# Patient Record
Sex: FEMALE | Race: WHITE | NOT HISPANIC OR LATINO | URBAN - METROPOLITAN AREA
[De-identification: names, ages, dates, MRNs, and addresses within clinical notes are randomized per-mention and may not be internally consistent; named-entity substitution may affect disease eponyms.]

---

## 2019-08-16 NOTE — H&P ADULT - NSICDXPASTMEDICALHX_GEN_ALL_CORE_FT
PAST MEDICAL HISTORY:  Aortic insufficiency     Breast cancer left    Carotid atherosclerosis     Hyperlipemia     Hypertension PAST MEDICAL HISTORY:  Aortic insufficiency     Breast cancer left    CAD in native artery     Carotid atherosclerosis     Hyperlipemia     Hypertension

## 2019-08-16 NOTE — H&P ADULT - PROBLEM SELECTOR PROBLEM 1
Causing some diarrhea.  If persistent will consider changing to prolia.    CAD (coronary artery disease)

## 2019-08-16 NOTE — H&P ADULT - PROBLEM SELECTOR PLAN 1
Pt is referred for Lt heart cath/possible PCI. Labs & medications are reviewed. Informed consent obtained after discussion of [facility] risks, benefits and alternatives  with patient. Risk discussed included, but not limited to MI, stroke, mortality, major bleeding, arrhythmia, or infection.  An educational material provided. Pt. verbalizes understandings of pre-procedural instructions.

## 2019-08-16 NOTE — H&P ADULT - NSICDXPASTSURGICALHX_GEN_ALL_CORE_FT
PAST SURGICAL HISTORY:  History of cataract extraction bilateral    History of tonsillectomy childhood    S/P D&C     S/P mastectomy left

## 2019-08-16 NOTE — H&P ADULT - NSHPREVIEWOFSYSTEMS_GEN_ALL_CORE
CONSTITUTIONAL: No fever, weight loss, or fatigue  EYES: No eye pain, visual disturbances, or discharge  ENMT:  No difficulty hearing, tinnitus, vertigo; No sinus or throat pain  NECK: No pain or stiffness  BREASTS: No pain, masses, or nipple discharge  RESPIRATORY: No cough, wheezing, chills or hemoptysis; No shortness of breath  CARDIOVASCULAR: No chest pain, palpitations, dizziness, or leg swelling  GASTROINTESTINAL: No abdominal or epigastric pain. No nausea, vomiting, or hematemesis; No diarrhea or constipation. No melena or hematochezia.  GENITOURINARY: No dysuria, frequency, hematuria, or incontinence  NEUROLOGICAL: No headaches, memory loss, loss of strength, numbness, or tremors  SKIN: No itching, burning, rashes, or lesions   LYMPH NODES: No enlarged glands  ENDOCRINE: No heat or cold intolerance; No hair loss  MUSCULOSKELETAL: No joint pain or swelling; No muscle, back, or extremity pain  PSYCHIATRIC: No depression, anxiety, mood swings, or difficulty sleeping  HEME/LYMPH: No easy bruising, or bleeding gums  ALLERGY AND IMMUNOLOGIC: No hives or eczema General: Pt denies recent weight loss/fever/chills    Neurological: denies numbness or  sensation loss    Cardiovascular: denies palpitations/leg edema (+)chest pressure on exertion    Respiratory and Thorax: denies SOB/cough/wheezing    Gastrointestinal: denies abdominal pain/diarrhea/constipation/bloody stool    Genitourinary: denies urinary frequency/urgency/ dysuria    Musculoskeletal: denies joint pain or swelling, denies restricted motion    Hematologic: denies abnormal bleeding

## 2019-08-16 NOTE — H&P ADULT - ASSESSMENT
84yr old female PMH of HTN, HLD, CAD ( s/p PCI of RCA in 2015) AI, carotid atherosclerosis, has been c/o intermittent Lt. chest pressure in exertion. Pt underwent stress test which showed (+) AS/AA ischemia.  Pt referred for LHC with possible intervention.  ASA: 2  Calculated bleeding risk score: 2.3 %  Creatinine: 1.01  GFR: 51

## 2019-08-16 NOTE — H&P ADULT - HISTORY OF PRESENT ILLNESS
84yr old male PMH HTN, HLD, CAD, AI, carotid atherosclerosis 84yr old female PMH of HTN, HLD, CAD ( s/p PCI of RCA in 2015) AI, carotid atherosclerosis, has been c/o intermittent Lt. chest pressure in exertion. Pt underwent stress test which showed (+) AS/AA ischemia.  Pt referred for LHC with possible intervention.

## 2019-08-16 NOTE — H&P ADULT - NSHPPHYSICALEXAM_GEN_ALL_CORE
GENERAL: NAD, well-groomed, well-developed  HEAD:  Atraumatic, Normocephalic  EYES: EOMI, PERRLA, conjunctiva and sclera clear  ENMT: No tonsillar erythema, exudates, or enlargement; Moist mucous membranes, Good dentition, No lesions  NECK: Supple, No JVD, Normal thyroid  NERVOUS SYSTEM:  Alert & Oriented X3, Good concentration; Motor Strength 5/5 B/L upper and lower extremities; DTRs 2+ intact and symmetric  CHEST/LUNG: Clear to auscultation bilaterally; No rales, rhonchi, wheezing, or rubs  HEART: Regular rate and rhythm; No murmurs, rubs, or gallops  ABDOMEN: Soft, Nontender, Nondistended; Bowel sounds present  EXTREMITIES:  2+ Peripheral Pulses, No clubbing, cyanosis, or edema  LYMPH: No lymphadenopathy noted  SKIN: No rashes or lesions Vital Signs : /70       HR  67     RR  16               Constitutional: well developed, well nourished, no deformities and no acute distress    Neurological: Alert & Oriented x 3, RIVAS, no focal deficits    HEENT: NC/AT, PERRLA, EOMI,  Neck supple.    Respiratory: CTA B/L, No wheezing/crackles/rhonchi    Cardiovascular: (+) S1 & S2, RRR, No m/r/g    Gastrointestinal: soft, NT, nondistended, (+) BS    Genitourinary: non distended bladder, voiding freely    Extremities: No pedal edema, No clubbing, No cyanosis    Skin:  normal skin color and pigmentation, no skin lesions

## 2019-08-19 ENCOUNTER — OUTPATIENT (OUTPATIENT)
Dept: OUTPATIENT SERVICES | Facility: HOSPITAL | Age: 84
LOS: 1 days | Discharge: HOME | End: 2019-08-19
Payer: MEDICARE

## 2019-08-19 VITALS
HEART RATE: 67 BPM | SYSTOLIC BLOOD PRESSURE: 149 MMHG | RESPIRATION RATE: 16 BRPM | OXYGEN SATURATION: 100 % | WEIGHT: 149.91 LBS | DIASTOLIC BLOOD PRESSURE: 72 MMHG | HEIGHT: 61 IN

## 2019-08-19 VITALS
RESPIRATION RATE: 18 BRPM | SYSTOLIC BLOOD PRESSURE: 137 MMHG | OXYGEN SATURATION: 99 % | HEART RATE: 66 BPM | DIASTOLIC BLOOD PRESSURE: 59 MMHG

## 2019-08-19 DIAGNOSIS — E78.00 PURE HYPERCHOLESTEROLEMIA, UNSPECIFIED: ICD-10-CM

## 2019-08-19 DIAGNOSIS — I25.110 ATHEROSCLEROTIC HEART DISEASE OF NATIVE CORONARY ARTERY WITH UNSTABLE ANGINA PECTORIS: ICD-10-CM

## 2019-08-19 DIAGNOSIS — I25.10 ATHEROSCLEROTIC HEART DISEASE OF NATIVE CORONARY ARTERY WITHOUT ANGINA PECTORIS: ICD-10-CM

## 2019-08-19 DIAGNOSIS — E78.5 HYPERLIPIDEMIA, UNSPECIFIED: ICD-10-CM

## 2019-08-19 DIAGNOSIS — R94.39 ABNORMAL RESULT OF OTHER CARDIOVASCULAR FUNCTION STUDY: ICD-10-CM

## 2019-08-19 DIAGNOSIS — I20.0 UNSTABLE ANGINA: ICD-10-CM

## 2019-08-19 DIAGNOSIS — Z85.3 PERSONAL HISTORY OF MALIGNANT NEOPLASM OF BREAST: ICD-10-CM

## 2019-08-19 DIAGNOSIS — I10 ESSENTIAL (PRIMARY) HYPERTENSION: ICD-10-CM

## 2019-08-19 DIAGNOSIS — Z88.1 ALLERGY STATUS TO OTHER ANTIBIOTIC AGENTS STATUS: ICD-10-CM

## 2019-08-19 PROCEDURE — 93454 CORONARY ARTERY ANGIO S&I: CPT

## 2019-08-19 PROCEDURE — C1874: CPT

## 2019-08-19 PROCEDURE — C1894: CPT

## 2019-08-19 PROCEDURE — C1760: CPT

## 2019-08-19 PROCEDURE — C1769: CPT

## 2019-08-19 PROCEDURE — C1887: CPT

## 2019-08-19 NOTE — PROGRESS NOTE ADULT - SUBJECTIVE AND OBJECTIVE BOX
Cath Lab Nurse Practitioner Note  HPI:  84yr old female PMH of HTN, HLD, CAD ( s/p PCI of RCA in 2015) AI, carotid atherosclerosis, has been c/o intermittent Lt. chest pressure in exertion. Pt underwent stress test which showed (+) AS/AA ischemia.  Pt referred for LHC with possible intervention.     s/p LHC :  of LAD  Pt denies chest pain/SOB/palpitations post cath.    Physical exam:  Vital Signs: /74  HR 63 RR 16  Cardiac : (+)S1S2, RRR  Lungs: CTA B/L  Abd: soft, NT, (+)BS  Ext: Rt groin (+) perclose closure device , 2+ Rt DP    A/P : CAD,  of LAD  -encourage po hydration  -medical Mx for CAD, ASA/plavix/b-blocker/statin  -d/c home today  -PCI with rotablator of LAD at [facility] as out pt  -f/u with  in 1-2 weeks  -Plan discussed with pt/Dr. Ramirez.

## 2019-08-19 NOTE — PACU DISCHARGE NOTE - COMMENTS
Pt. and dtr. verb. agreement and understanding to and teach back to written and verbal discharge instructions and medication list.   Pt. discharged to home via private auto accompanied by dtr; escorted to auto via w/c by transport tech.

## 2019-08-26 ENCOUNTER — INPATIENT (INPATIENT)
Facility: HOSPITAL | Age: 84
LOS: 0 days | Discharge: HOME | DRG: 287 | End: 2019-08-26
Attending: INTERNAL MEDICINE | Admitting: INTERNAL MEDICINE
Payer: COMMERCIAL

## 2019-08-26 ENCOUNTER — TRANSCRIPTION ENCOUNTER (OUTPATIENT)
Age: 84
End: 2019-08-26

## 2019-08-26 VITALS
DIASTOLIC BLOOD PRESSURE: 64 MMHG | OXYGEN SATURATION: 95 % | RESPIRATION RATE: 17 BRPM | TEMPERATURE: 98 F | SYSTOLIC BLOOD PRESSURE: 128 MMHG | HEART RATE: 66 BPM

## 2019-08-26 VITALS
TEMPERATURE: 92 F | HEART RATE: 68 BPM | OXYGEN SATURATION: 99 % | SYSTOLIC BLOOD PRESSURE: 186 MMHG | RESPIRATION RATE: 16 BRPM | HEIGHT: 61 IN | WEIGHT: 149.91 LBS | DIASTOLIC BLOOD PRESSURE: 87 MMHG

## 2019-08-26 DIAGNOSIS — I25.10 ATHEROSCLEROTIC HEART DISEASE OF NATIVE CORONARY ARTERY WITHOUT ANGINA PECTORIS: ICD-10-CM

## 2019-08-26 PROBLEM — I35.1 NONRHEUMATIC AORTIC (VALVE) INSUFFICIENCY: Chronic | Status: ACTIVE | Noted: 2019-08-16

## 2019-08-26 PROBLEM — I65.29 OCCLUSION AND STENOSIS OF UNSPECIFIED CAROTID ARTERY: Chronic | Status: ACTIVE | Noted: 2019-08-16

## 2019-08-26 LAB
ALBUMIN SERPL ELPH-MCNC: 4.6 G/DL — SIGNIFICANT CHANGE UP (ref 3.3–5)
ALP SERPL-CCNC: 69 U/L — SIGNIFICANT CHANGE UP (ref 40–120)
ALT FLD-CCNC: 19 U/L — SIGNIFICANT CHANGE UP (ref 10–45)
ANION GAP SERPL CALC-SCNC: 14 MMOL/L — SIGNIFICANT CHANGE UP (ref 5–17)
AST SERPL-CCNC: 34 U/L — SIGNIFICANT CHANGE UP (ref 10–40)
BILIRUB SERPL-MCNC: 1.3 MG/DL — HIGH (ref 0.2–1.2)
BUN SERPL-MCNC: 22 MG/DL — SIGNIFICANT CHANGE UP (ref 7–23)
CALCIUM SERPL-MCNC: 10.3 MG/DL — SIGNIFICANT CHANGE UP (ref 8.4–10.5)
CHLORIDE SERPL-SCNC: 103 MMOL/L — SIGNIFICANT CHANGE UP (ref 96–108)
CO2 SERPL-SCNC: 22 MMOL/L — SIGNIFICANT CHANGE UP (ref 22–31)
CREAT SERPL-MCNC: 1.1 MG/DL — SIGNIFICANT CHANGE UP (ref 0.5–1.3)
GLUCOSE SERPL-MCNC: 101 MG/DL — HIGH (ref 70–99)
HCT VFR BLD CALC: 40.7 % — SIGNIFICANT CHANGE UP (ref 34.5–45)
HGB BLD-MCNC: 13.5 G/DL — SIGNIFICANT CHANGE UP (ref 11.5–15.5)
MCHC RBC-ENTMCNC: 31.5 PG — SIGNIFICANT CHANGE UP (ref 27–34)
MCHC RBC-ENTMCNC: 33.2 GM/DL — SIGNIFICANT CHANGE UP (ref 32–36)
MCV RBC AUTO: 95 FL — SIGNIFICANT CHANGE UP (ref 80–100)
PLATELET # BLD AUTO: 240 K/UL — SIGNIFICANT CHANGE UP (ref 150–400)
POTASSIUM SERPL-MCNC: 5.1 MMOL/L — SIGNIFICANT CHANGE UP (ref 3.5–5.3)
POTASSIUM SERPL-SCNC: 5.1 MMOL/L — SIGNIFICANT CHANGE UP (ref 3.5–5.3)
PROT SERPL-MCNC: 7.9 G/DL — SIGNIFICANT CHANGE UP (ref 6–8.3)
RBC # BLD: 4.29 M/UL — SIGNIFICANT CHANGE UP (ref 3.8–5.2)
RBC # FLD: 12 % — SIGNIFICANT CHANGE UP (ref 10.3–14.5)
SODIUM SERPL-SCNC: 139 MMOL/L — SIGNIFICANT CHANGE UP (ref 135–145)
WBC # BLD: 7.2 K/UL — SIGNIFICANT CHANGE UP (ref 3.8–10.5)
WBC # FLD AUTO: 7.2 K/UL — SIGNIFICANT CHANGE UP (ref 3.8–10.5)

## 2019-08-26 PROCEDURE — 85027 COMPLETE CBC AUTOMATED: CPT

## 2019-08-26 PROCEDURE — 93010 ELECTROCARDIOGRAM REPORT: CPT

## 2019-08-26 PROCEDURE — 99153 MOD SED SAME PHYS/QHP EA: CPT

## 2019-08-26 PROCEDURE — C1725: CPT

## 2019-08-26 PROCEDURE — 80053 COMPREHEN METABOLIC PANEL: CPT

## 2019-08-26 PROCEDURE — C1769: CPT

## 2019-08-26 PROCEDURE — C1887: CPT

## 2019-08-26 PROCEDURE — 99152 MOD SED SAME PHYS/QHP 5/>YRS: CPT

## 2019-08-26 PROCEDURE — 92943 PRQ TRLUML REVSC CH OCC ANT: CPT | Mod: 74,LD

## 2019-08-26 PROCEDURE — C1894: CPT

## 2019-08-26 PROCEDURE — 93005 ELECTROCARDIOGRAM TRACING: CPT

## 2019-08-26 PROCEDURE — 93458 L HRT ARTERY/VENTRICLE ANGIO: CPT

## 2019-08-26 RX ORDER — AMLODIPINE BESYLATE 2.5 MG/1
2.5 TABLET ORAL
Refills: 0 | Status: DISCONTINUED | OUTPATIENT
Start: 2019-08-26 | End: 2019-08-26

## 2019-08-26 RX ORDER — METOPROLOL TARTRATE 50 MG
50 TABLET ORAL DAILY
Refills: 0 | Status: DISCONTINUED | OUTPATIENT
Start: 2019-08-27 | End: 2019-08-26

## 2019-08-26 RX ORDER — LOSARTAN POTASSIUM 100 MG/1
100 TABLET, FILM COATED ORAL DAILY
Refills: 0 | Status: DISCONTINUED | OUTPATIENT
Start: 2019-08-26 | End: 2019-08-26

## 2019-08-26 RX ORDER — CLOPIDOGREL BISULFATE 75 MG/1
75 TABLET, FILM COATED ORAL DAILY
Refills: 0 | Status: DISCONTINUED | OUTPATIENT
Start: 2019-08-27 | End: 2019-08-26

## 2019-08-26 RX ORDER — ASPIRIN/CALCIUM CARB/MAGNESIUM 324 MG
81 TABLET ORAL DAILY
Refills: 0 | Status: DISCONTINUED | OUTPATIENT
Start: 2019-08-26 | End: 2019-08-26

## 2019-08-26 RX ORDER — CALCIUM CARBONATE 500(1250)
0 TABLET ORAL
Qty: 0 | Refills: 0 | DISCHARGE

## 2019-08-26 RX ORDER — ATORVASTATIN CALCIUM 80 MG/1
40 TABLET, FILM COATED ORAL AT BEDTIME
Refills: 0 | Status: DISCONTINUED | OUTPATIENT
Start: 2019-08-26 | End: 2019-08-26

## 2019-08-26 RX ADMIN — Medication 1 TABLET(S): at 18:14

## 2019-08-26 RX ADMIN — Medication 81 MILLIGRAM(S): at 18:14

## 2019-08-26 RX ADMIN — AMLODIPINE BESYLATE 2.5 MILLIGRAM(S): 2.5 TABLET ORAL at 18:14

## 2019-08-26 NOTE — DISCHARGE NOTE PROVIDER - NSDCFUADDINST_GEN_ALL_CORE_FT
No heavy lifting, strenuous activity, bending, straining, or unnecessary stair climbing for 2 weeks. No driving for 2 days. You may shower 24 hours following the procedure but avoid baths/swimming for 1 week. Check your groin site for bleeding and/or swelling daily following procedure and call your doctor immediately if it occurs or if you experience increased pain at the site. Follow up with your cardiologist in 1-2 weeks. You may call [facility] if you have any questions/concerns regarding your procedure [phone].

## 2019-08-26 NOTE — CHART NOTE - NSCHARTNOTEFT_GEN_A_CORE
Removal of Femoral Sheath    Pulses in the right lower extremity are palpable. The patient was placed in the supine position. The insertion site was identified and the sutures were removed per protocol.  The 6____ [place] femoral sheath was then removed. Direct pressure was applied for  __20____ minutes.     Monitoring of the right groin and both lower extremities including neuro-vascular checks and vital signs every 15 minutes x 4, then every 30 minutes x 2, then every 1 hour was ordered.    Complications: None    Comments: patient may sit up at 1630 and ambulate at 1830 if right groin is stable    ALMA Marcus [id number]

## 2019-08-26 NOTE — H&P CARDIOLOGY - PSH
History of cataract extraction  bilateral  History of tonsillectomy  childhood  S/P D&C    S/P mastectomy  left

## 2019-08-26 NOTE — DISCHARGE NOTE PROVIDER - NSDCCPTREATMENT_GEN_ALL_CORE_FT
PRINCIPAL PROCEDURE  Procedure: Cardiac catheterization, left heart  Findings and Treatment: Cardiac cath was performed for staged PCI of LAD however the lesion was unable to be crossed.

## 2019-08-26 NOTE — H&P CARDIOLOGY - PMH
Aortic insufficiency    Breast cancer  left  CAD in native artery    Carotid atherosclerosis    H/O thyroid nodule    Hyperlipemia    Hypertension    Non-rheumatic tricuspid valve insufficiency

## 2019-08-26 NOTE — DISCHARGE NOTE PROVIDER - NSDCCPCAREPLAN_GEN_ALL_CORE_FT
PRINCIPAL DISCHARGE DIAGNOSIS  Diagnosis: CAD (coronary artery disease)  Assessment and Plan of Treatment: Diagnostic cath performed at [facility] revealed distal LAD disease. The patient was brought to [facility] for staged PCI of LAD, however the lesion could not be crossed and the PCI was unsuccessful. She will follow up with Dr. Ramirez and continue her current medication.

## 2019-08-26 NOTE — DISCHARGE NOTE PROVIDER - CARE PROVIDER_API CALL
Cholo Hill)  Cardiovascular Disease; Internal Medicine  [address] [place] [address]  Phone: [phone]  Fax: [phone]  Follow Up Time:     Dee Ramirez)  Cardiovascular Disease; Interventional Cardiology  [address] [place] [address]  Phone: [phone]  Fax: [phone]  Follow Up Time:

## 2019-08-26 NOTE — DISCHARGE NOTE NURSING/CASE MANAGEMENT/SOCIAL WORK - NSDCDPATPORTLINK_GEN_ALL_CORE
You can access the [facility], offered by [facility], by registering with the following website: [email or web address]

## 2019-08-26 NOTE — DISCHARGE NOTE PROVIDER - HOSPITAL COURSE
84yr old  female with PMH of HTN, HLD, carotid stenosis (<501%), CAD (s/p PCI of RCA in 2015), mild aortic stenosis with moderate aortic regurgitation, mild mitral regurgitation, moderate tricuspid regurgitation carotid atherosclerosis, has been c/o intermittent left chest pressure on exertion for few weeks. Pt was evaluated by her cardiologist and underwent nuclear stress test on 8/05/19 which showed (+) anteroseptal, anteroapical ischemia.  Pt was referred for LHC with Dr Price and s/p diagnostic LHC on 8/19/19 at [facility] (result not on [place]) and presents today for planned PCI here at [facility] with Dr Price.  Cardiac cath was performed by Dr. Ramirez, however the lesion was unable to be crossed and the PCI was unsuccessful.  She will be discharged today as per Dr. Ramirez and follow up in 2 months for possible intervention.

## 2019-08-26 NOTE — H&P CARDIOLOGY - HISTORY OF PRESENT ILLNESS
84yr old  female with PMH of HTN, HLD, CAD (s/p PCI of RCA in 2015), mild aortic stenosis with moderate aortic regurgitation, mild mitral regurgitation, moderate tricuspid regurgitation carotid atherosclerosis, has been c/o intermittent left chest pressure on exertion for few weeks. Pt was evaluated by her cardiologist and underwent nuclear stress test on 8/05/19 which showed (+) anteroseptal, anteroapical ischemia.  Pt was referred for LHC with Dr Price and s/p diagnostic LHC on 8/19/19 at [facility] (result not on [place]) and presents today for planned PCI here at [facility] with Dr Price.  Denied dizziness, diaphoresis, palpitations, nausea, vomiting, peripheral edema, recent weight gain, or syncope.     PCP Dr Danielle Coulter  Cards Dr Cholo Hill 84yr old  female with PMH of HTN, HLD, carotid stenosis (<501%), CAD (s/p PCI of RCA in 2015), mild aortic stenosis with moderate aortic regurgitation, mild mitral regurgitation, moderate tricuspid regurgitation carotid atherosclerosis, has been c/o intermittent left chest pressure on exertion for few weeks. Pt was evaluated by her cardiologist and underwent nuclear stress test on 8/05/19 which showed (+) anteroseptal, anteroapical ischemia.  Pt was referred for LHC with Dr Price and s/p diagnostic LHC on 8/19/19 at [facility] (result not on [place]) and presents today for planned PCI here at [facility] with Dr Price.  Denied dizziness, diaphoresis, palpitations, nausea, vomiting, peripheral edema, recent weight gain, or syncope.     PCP Dr Danielle Coulter  Cards Dr Cholo Hill

## 2019-08-26 NOTE — H&P CARDIOLOGY - ADDITIONAL PE
Rt groin ecchymosis due to prior cath last week. Rt wrist and groin mild ecchymosis, due to prior cath last week.

## 2019-11-20 PROBLEM — I36.1 NONRHEUMATIC TRICUSPID (VALVE) INSUFFICIENCY: Chronic | Status: ACTIVE | Noted: 2019-08-26

## 2019-11-20 PROBLEM — Z86.39 PERSONAL HISTORY OF OTHER ENDOCRINE, NUTRITIONAL AND METABOLIC DISEASE: Chronic | Status: ACTIVE | Noted: 2019-08-26

## 2019-11-25 ENCOUNTER — OUTPATIENT (OUTPATIENT)
Dept: OUTPATIENT SERVICES | Facility: HOSPITAL | Age: 84
LOS: 1 days | End: 2019-11-25
Payer: MEDICARE

## 2019-11-25 VITALS
TEMPERATURE: 98 F | DIASTOLIC BLOOD PRESSURE: 84 MMHG | OXYGEN SATURATION: 98 % | RESPIRATION RATE: 18 BRPM | WEIGHT: 151.9 LBS | SYSTOLIC BLOOD PRESSURE: 183 MMHG | HEIGHT: 61 IN | HEART RATE: 67 BPM

## 2019-11-25 DIAGNOSIS — I25.10 ATHEROSCLEROTIC HEART DISEASE OF NATIVE CORONARY ARTERY WITHOUT ANGINA PECTORIS: ICD-10-CM

## 2019-11-25 LAB
ALBUMIN SERPL ELPH-MCNC: 4.6 G/DL — SIGNIFICANT CHANGE UP (ref 3.3–5)
ALP SERPL-CCNC: 62 U/L — SIGNIFICANT CHANGE UP (ref 40–120)
ALT FLD-CCNC: 15 U/L — SIGNIFICANT CHANGE UP (ref 10–45)
ANION GAP SERPL CALC-SCNC: 10 MMOL/L — SIGNIFICANT CHANGE UP (ref 5–17)
AST SERPL-CCNC: 22 U/L — SIGNIFICANT CHANGE UP (ref 10–40)
BILIRUB SERPL-MCNC: 0.9 MG/DL — SIGNIFICANT CHANGE UP (ref 0.2–1.2)
BUN SERPL-MCNC: 17 MG/DL — SIGNIFICANT CHANGE UP (ref 7–23)
CALCIUM SERPL-MCNC: 10.1 MG/DL — SIGNIFICANT CHANGE UP (ref 8.4–10.5)
CHLORIDE SERPL-SCNC: 103 MMOL/L — SIGNIFICANT CHANGE UP (ref 96–108)
CO2 SERPL-SCNC: 25 MMOL/L — SIGNIFICANT CHANGE UP (ref 22–31)
CREAT SERPL-MCNC: 1.06 MG/DL — SIGNIFICANT CHANGE UP (ref 0.5–1.3)
GLUCOSE SERPL-MCNC: 109 MG/DL — HIGH (ref 70–99)
HCT VFR BLD CALC: 39.1 % — SIGNIFICANT CHANGE UP (ref 34.5–45)
HGB BLD-MCNC: 13.2 G/DL — SIGNIFICANT CHANGE UP (ref 11.5–15.5)
MCHC RBC-ENTMCNC: 31.6 PG — SIGNIFICANT CHANGE UP (ref 27–34)
MCHC RBC-ENTMCNC: 33.8 GM/DL — SIGNIFICANT CHANGE UP (ref 32–36)
MCV RBC AUTO: 93.5 FL — SIGNIFICANT CHANGE UP (ref 80–100)
NRBC # BLD: 0 /100 WBCS — SIGNIFICANT CHANGE UP (ref 0–0)
PLATELET # BLD AUTO: 231 K/UL — SIGNIFICANT CHANGE UP (ref 150–400)
POTASSIUM SERPL-MCNC: 4.4 MMOL/L — SIGNIFICANT CHANGE UP (ref 3.5–5.3)
POTASSIUM SERPL-SCNC: 4.4 MMOL/L — SIGNIFICANT CHANGE UP (ref 3.5–5.3)
PROT SERPL-MCNC: 7.7 G/DL — SIGNIFICANT CHANGE UP (ref 6–8.3)
RBC # BLD: 4.18 M/UL — SIGNIFICANT CHANGE UP (ref 3.8–5.2)
RBC # FLD: 13.3 % — SIGNIFICANT CHANGE UP (ref 10.3–14.5)
SODIUM SERPL-SCNC: 138 MMOL/L — SIGNIFICANT CHANGE UP (ref 135–145)
WBC # BLD: 6.41 K/UL — SIGNIFICANT CHANGE UP (ref 3.8–10.5)
WBC # FLD AUTO: 6.41 K/UL — SIGNIFICANT CHANGE UP (ref 3.8–10.5)

## 2019-11-25 PROCEDURE — C1760: CPT

## 2019-11-25 PROCEDURE — 93005 ELECTROCARDIOGRAM TRACING: CPT

## 2019-11-25 PROCEDURE — C1887: CPT

## 2019-11-25 PROCEDURE — C1894: CPT

## 2019-11-25 PROCEDURE — C1769: CPT

## 2019-11-25 PROCEDURE — 93010 ELECTROCARDIOGRAM REPORT: CPT

## 2019-11-25 PROCEDURE — 80053 COMPREHEN METABOLIC PANEL: CPT

## 2019-11-25 PROCEDURE — 99152 MOD SED SAME PHYS/QHP 5/>YRS: CPT

## 2019-11-25 PROCEDURE — 99153 MOD SED SAME PHYS/QHP EA: CPT

## 2019-11-25 PROCEDURE — 92920 PRQ TRLUML C ANGIOP 1ART&/BR: CPT | Mod: LD,74

## 2019-11-25 PROCEDURE — 85027 COMPLETE CBC AUTOMATED: CPT

## 2019-11-25 RX ORDER — METOPROLOL TARTRATE 50 MG
1 TABLET ORAL
Qty: 0 | Refills: 0 | DISCHARGE

## 2019-11-25 RX ORDER — OMEGA-3 ACID ETHYL ESTERS 1 G
0 CAPSULE ORAL
Qty: 0 | Refills: 0 | DISCHARGE

## 2019-11-25 RX ORDER — CHOLECALCIFEROL (VITAMIN D3) 125 MCG
1 CAPSULE ORAL
Qty: 0 | Refills: 0 | DISCHARGE

## 2019-11-25 RX ORDER — SODIUM CHLORIDE 9 MG/ML
1000 INJECTION INTRAMUSCULAR; INTRAVENOUS; SUBCUTANEOUS
Refills: 0 | Status: DISCONTINUED | OUTPATIENT
Start: 2019-11-25 | End: 2019-12-18

## 2019-11-25 RX ORDER — AMLODIPINE BESYLATE 2.5 MG/1
1 TABLET ORAL
Qty: 0 | Refills: 0 | DISCHARGE

## 2019-11-25 RX ORDER — CLOPIDOGREL BISULFATE 75 MG/1
1 TABLET, FILM COATED ORAL
Qty: 0 | Refills: 0 | DISCHARGE

## 2019-11-25 RX ORDER — PREGABALIN 225 MG/1
0 CAPSULE ORAL
Qty: 0 | Refills: 0 | DISCHARGE

## 2019-11-25 RX ORDER — ASPIRIN/CALCIUM CARB/MAGNESIUM 324 MG
1 TABLET ORAL
Qty: 0 | Refills: 0 | DISCHARGE

## 2019-11-25 RX ORDER — LOSARTAN POTASSIUM 100 MG/1
1 TABLET, FILM COATED ORAL
Qty: 0 | Refills: 0 | DISCHARGE

## 2019-11-25 RX ORDER — ATORVASTATIN CALCIUM 80 MG/1
1 TABLET, FILM COATED ORAL
Qty: 0 | Refills: 0 | DISCHARGE

## 2019-11-25 RX ORDER — L.ACIDOPH/B.ANIMALIS/B.LONGUM 15B CELL
1 CAPSULE ORAL
Qty: 0 | Refills: 0 | DISCHARGE

## 2019-11-25 RX ORDER — CALCIUM CARBONATE 500(1250)
1 TABLET ORAL
Qty: 0 | Refills: 0 | DISCHARGE

## 2019-11-25 NOTE — H&P CARDIOLOGY - HISTORY OF PRESENT ILLNESS
84yr old  female with PMH of HTN, HLD, carotid stenosis (<501%), CAD (s/p PCI of RCA in 2015), mild aortic stenosis with moderate aortic regurgitation, mild mitral regurgitation, moderate tricuspid regurgitation carotid atherosclerosis, left breast cancer s/p lumpectomy then masectomy with radiation therapy 2003 who was evaluated by her cardiologist and underwent nuclear stress test on 8/05/19 which showed (+) anteroseptal, anteroapical ischemia.  Pt was referred for LHC with Dr Ramirez s/p diagnostic LHC at [facility] and was transferred to [facility] 8/26/19 where PTCA was attempted but was unsuccessful.  Pt. denies chest pain/pressure, SOB/MURRELL, dizziness, diaphoresis, palpitations, nausea, vomiting, peripheral edema, recent weight gain, or syncope.  No implanted monitoring devices. Pt. endorses fatigue at end of day. Pt. presents for further evaluation and cardiac cath/PCI.    PCP Dr Danielle Coulter  Cards Dr Cholo Hill    Symptoms:        Angina (Class): n/a       Ischemic Symptoms: n/a    Prior Cardiac Interventions:       PCI's:  RCA NUSRAT; unsuccessful PTCA of LAD       CABG:      Antianginal Therapies:        Beta Blockers:  Metoprolol       Calcium Channel Blockers:  Amlodipine       Long Acting Nitrates:        Ranexa:     Associated Risk Factors:        Cerebrovascular Disease: N/A       Chronic Lung Disease: N/A       Peripheral Arterial Disease: N/A       Chronic Kidney Disease (if yes, what is GFR): N/A       Uncontrolled Diabetes (if yes, what is HgbA1C or FBS): N/A       Poorly Controlled Hypertension (if yes, what is SBP): yes 183/84       Morbid Obesity (if yes, what is BMI): N/A       History of Recent Ventricular Arrhythmia: N/A       Inability to Ambulate Safely: N/A       Need for Therapeutic Anticoagulation: N/A       Antiplatelet or Contrast Allergy: N/A 84yr old  female with PMH of HTN, HLD, carotid stenosis (<50%), CAD (s/p PCI of RCA in 2015), mild aortic stenosis with moderate aortic regurgitation, mild mitral regurgitation, moderate tricuspid regurgitation carotid atherosclerosis, left breast cancer s/p lumpectomy then masectomy with radiation therapy 2003 who was evaluated by her cardiologist and underwent nuclear stress test on 8/05/19 which showed (+) anteroseptal, anteroapical ischemia.  Pt was referred for LHC with Dr Ramirez s/p diagnostic LHC at [facility] and was transferred to [facility] 8/26/19 where PTCA was attempted but was unsuccessful.  Pt. denies chest pain/pressure, SOB/MURRELL, dizziness, diaphoresis, palpitations, nausea, vomiting, peripheral edema, recent weight gain, or syncope.  No implanted monitoring devices. Pt. endorses fatigue at end of day. Pt. presents for further evaluation and cardiac cath/PCI.    PCP Dr Danielle Coulter  Cards Dr Cholo Hill    Symptoms:        Angina (Class): n/a       Ischemic Symptoms: n/a    Prior Cardiac Interventions:       PCI's:  RCA NUSRAT; unsuccessful PTCA of LAD       CABG:      Antianginal Therapies:        Beta Blockers:  Metoprolol       Calcium Channel Blockers:  Amlodipine       Long Acting Nitrates:        Ranexa:     Associated Risk Factors:        Cerebrovascular Disease: N/A       Chronic Lung Disease: N/A       Peripheral Arterial Disease: N/A       Chronic Kidney Disease (if yes, what is GFR): N/A       Uncontrolled Diabetes (if yes, what is HgbA1C or FBS): N/A       Poorly Controlled Hypertension (if yes, what is SBP): yes 183/84       Morbid Obesity (if yes, what is BMI): N/A       History of Recent Ventricular Arrhythmia: N/A       Inability to Ambulate Safely: N/A       Need for Therapeutic Anticoagulation: N/A       Antiplatelet or Contrast Allergy: N/A

## 2022-10-24 DIAGNOSIS — Z90.10 ACQUIRED ABSENCE OF UNSPECIFIED BREAST AND NIPPLE: ICD-10-CM

## 2022-11-02 ENCOUNTER — APPOINTMENT (OUTPATIENT)
Dept: OBGYN | Facility: CLINIC | Age: 87
End: 2022-11-02

## 2022-11-02 DIAGNOSIS — N81.2 INCOMPLETE UTEROVAGINAL PROLAPSE: ICD-10-CM

## 2022-11-02 PROCEDURE — 99214 OFFICE O/P EST MOD 30 MIN: CPT

## 2022-11-03 NOTE — DISCUSSION/SUMMARY
[FreeTextEntry1] : Plan to keep pessary removed until erosion is healed. Start estrogen vaginal cream and follow up with me in 6 weeks for possible reinsertion of pessary.\par \par All her questions were answered she is agreeable with plan.\par \par Written by Salud VALADEZ, acting as a scribe for Dr. Bland. This note accurately reflects the work and decisions made by me.\par

## 2022-11-03 NOTE — HISTORY OF PRESENT ILLNESS
[Moderate Bleeding] : described as moderate in severity [FreeTextEntry8] : bright red bleeding from apical erosion from pessary [FreeTextEntry2] : P

## 2022-11-03 NOTE — CHIEF COMPLAINT
[Urgent Visit] : Urgent Visit [FreeTextEntry1] : Patient is an 88y/o female here today for bright red bleeding,  and pessary removal , cleaning and reinsertion. \par Indication for pessary uterovaginal prolapse.

## 2022-11-03 NOTE — PHYSICAL EXAM
[Chaperone Present] : A chaperone was present in the examining room during all aspects of the physical examination [Normal] : urethra [Labia Majora] : labia major [Labia Minora] : labia minora [Moderate] : there was moderate vaginal bleeding [FreeTextEntry4] : apical erosion from pessary

## 2022-12-21 ENCOUNTER — APPOINTMENT (OUTPATIENT)
Dept: OBGYN | Facility: CLINIC | Age: 87
End: 2022-12-21

## 2022-12-21 PROCEDURE — A4562: CPT

## 2022-12-21 PROCEDURE — 99213 OFFICE O/P EST LOW 20 MIN: CPT

## 2022-12-27 ENCOUNTER — APPOINTMENT (OUTPATIENT)
Dept: OBGYN | Facility: CLINIC | Age: 87
End: 2022-12-27

## 2022-12-27 PROCEDURE — A4562: CPT

## 2022-12-27 PROCEDURE — 99213 OFFICE O/P EST LOW 20 MIN: CPT

## 2022-12-28 NOTE — HISTORY OF PRESENT ILLNESS
[FreeTextEntry1] : Patient is an 86 y/o female here today to reinsert her pessary. She had pessary removed on 11/3/22 due to vaginal erosions and vaginal bleeding. she states she feels a bulge in her vagina when she walks. \par \par

## 2022-12-28 NOTE — PLAN
As evidenced by fever, tachycardia, bacteremia secondary to left leg cellulitis  Lactic acid on admission was 1 7  Blood cultures 2 / 2 on admission positive for MRSA  Patient was initially started on IV cefazolin and was transitioned to intravenous vancomycin on 02/09    Follow-up on repeat blood cultures to assess clearance of bacteremia  Infectious disease consulted [FreeTextEntry1] : \par \par patient to f/u in 3 months for pessary cleaning and reinsertion.\par all of her questions were answered she is agreeable with plan.\par \par \par \par I Salud LIZ-C am scribing for the presence of Dr. Bland the following sections HISTORY OF PRESENT ILLNESS, PAST MEDICAL/FAMILY/SOCIAL HISTORY; REVIEW OF SYSTEMS; VITAL SIGNS; PHYSICAL EXAM; DISPOSITION. \par \par \par I personally performed the services described in the documentation, reviewed the documentation recorded by the scribe in my presence and it accurately and completely records my words and actions.\par

## 2022-12-28 NOTE — PLAN
[FreeTextEntry1] : \par \par patient to follow up in 3 months for pessary cleaning removal and insertion.\par she is to call me if bleeding occurs or pessary falls out.\par all of her questions were answered she is agreeable with plan.\par \par \par \par I Salud NEWTONC am scribing for the presence of Dr. Bland the following sections HISTORY OF PRESENT ILLNESS, PAST MEDICAL/FAMILY/SOCIAL HISTORY; REVIEW OF SYSTEMS; VITAL SIGNS; PHYSICAL EXAM; DISPOSITION. \par \par \par I personally performed the services described in the documentation, reviewed the documentation recorded by the scribe in my presence and it accurately and completely records my words and actions.\par

## 2022-12-28 NOTE — PHYSICAL EXAM
[Chaperone Present] : A chaperone was present in the examining room during all aspects of the physical examination [Appropriately responsive] : appropriately responsive [Oriented x3] : oriented x3 [Labia Majora] : normal [Labia Minora] : normal [Atrophy] : atrophy [No Bleeding] : There was no active vaginal bleeding [Normal] : normal [FreeTextEntry1] : Salud LIZ-PRINCE chaperoned during entire physical exam

## 2023-01-17 ENCOUNTER — OFFICE (OUTPATIENT)
Dept: URBAN - METROPOLITAN AREA CLINIC 102 | Facility: CLINIC | Age: 88
Setting detail: OPHTHALMOLOGY
End: 2023-01-17
Payer: MEDICARE

## 2023-01-17 DIAGNOSIS — H16.223: ICD-10-CM

## 2023-01-17 DIAGNOSIS — H40.013: ICD-10-CM

## 2023-01-17 DIAGNOSIS — Z96.1: ICD-10-CM

## 2023-01-17 DIAGNOSIS — H26.493: ICD-10-CM

## 2023-01-17 DIAGNOSIS — H35.373: ICD-10-CM

## 2023-01-17 PROCEDURE — 92133 CPTRZD OPH DX IMG PST SGM ON: CPT | Performed by: OPHTHALMOLOGY

## 2023-01-17 PROCEDURE — 92014 COMPRE OPH EXAM EST PT 1/>: CPT | Performed by: OPHTHALMOLOGY

## 2023-01-17 PROCEDURE — 92083 EXTENDED VISUAL FIELD XM: CPT | Performed by: OPHTHALMOLOGY

## 2023-01-17 ASSESSMENT — KERATOMETRY
OD_K1POWER_DIOPTERS: 42.00
METHOD_AUTO_MANUAL: AUTO
OS_K2POWER_DIOPTERS: 44.25
OD_AXISANGLE_DEGREES: 015
OS_AXISANGLE_DEGREES: 163
OD_K2POWER_DIOPTERS: 44.25
OS_K1POWER_DIOPTERS: 42.50

## 2023-01-17 ASSESSMENT — TONOMETRY
OS_IOP_MMHG: 19
OD_IOP_MMHG: 14
OS_IOP_MMHG: 15
OD_IOP_MMHG: 15

## 2023-01-17 ASSESSMENT — REFRACTION_CURRENTRX
OD_ADD: +2.50
OS_AXIS: 075
OD_OVR_VA: 20/
OD_CYLINDER: -2.25
OS_VPRISM_DIRECTION: BF
OD_OVR_VA: 20/
OD_SPHERE: +1.25
OS_CYLINDER: -1.00
OD_SPHERE: +1.75
OD_VPRISM_DIRECTION: BF
OS_CYLINDER: -1.50
OS_SPHERE: -0.50
OS_AXIS: 080
OD_ADD: +2.50
OD_AXIS: 105
OS_ADD: +2.50
OS_SPHERE: +0.50
OD_CYLINDER: -1.50
OS_OVR_VA: 20/
OS_ADD: +2.50
OS_OVR_VA: 20/
OD_AXIS: 112

## 2023-01-17 ASSESSMENT — REFRACTION_MANIFEST
OD_ADD: +2.50
OS_CYLINDER: -1.50
OD_CYLINDER: -2.25
OD_SPHERE: +1.75
OS_ADD: +2.50
OD_AXIS: 105
OS_AXIS: 075
OS_SPHERE: +0.50
OS_AXIS: 075
OS_CYLINDER: -1.50
OS_SPHERE: +0.75
OS_VA1: 20/20-2
OD_AXIS: 105
OD_ADD: +2.50
OD_CYLINDER: -2.25
OD_SPHERE: +1.75
OD_VA1: 20/20
OD_VA1: 20/20
OS_VA1: 20/20-2
OS_ADD: +2.50

## 2023-01-17 ASSESSMENT — SPHEQUIV_DERIVED
OS_SPHEQUIV: 0
OD_SPHEQUIV: 0.625
OD_SPHEQUIV: 0.625
OS_SPHEQUIV: -0.625
OS_SPHEQUIV: -0.25
OD_SPHEQUIV: 0.625

## 2023-01-17 ASSESSMENT — DECREASING TEAR LAKE - SEVERITY SCORE
OS_DEC_TEARLAKE: T
OD_DEC_TEARLAKE: T

## 2023-01-17 ASSESSMENT — VISUAL ACUITY
OS_BCVA: 20/25-
OD_BCVA: 20/25-2

## 2023-01-17 ASSESSMENT — REFRACTION_AUTOREFRACTION
OD_AXIS: 106
OS_AXIS: 078
OD_CYLINDER: -2.75
OS_SPHERE: +0.25
OD_SPHERE: +2.00
OS_CYLINDER: -1.75

## 2023-01-17 ASSESSMENT — PACHYMETRY
OS_CT_CORRECTION: 1
OD_CT_CORRECTION: 0
OS_CT_UM: 538
OD_CT_UM: 548

## 2023-01-17 ASSESSMENT — AXIALLENGTH_DERIVED
OD_AL: 23.4866
OS_AL: 23.6379
OD_AL: 23.4866
OS_AL: 23.7361
OS_AL: 23.8849
OD_AL: 23.4866

## 2023-01-17 ASSESSMENT — CONFRONTATIONAL VISUAL FIELD TEST (CVF)
OD_FINDINGS: FULL
OS_FINDINGS: FULL

## 2023-03-15 ENCOUNTER — APPOINTMENT (OUTPATIENT)
Dept: OBGYN | Facility: CLINIC | Age: 88
End: 2023-03-15
Payer: MEDICARE

## 2023-03-15 DIAGNOSIS — N95.2 POSTMENOPAUSAL ATROPHIC VAGINITIS: ICD-10-CM

## 2023-03-15 PROCEDURE — 99213 OFFICE O/P EST LOW 20 MIN: CPT

## 2023-03-15 RX ORDER — ESTRADIOL 0.1 MG/G
0.1 CREAM VAGINAL
Qty: 1 | Refills: 3 | Status: ACTIVE | COMMUNITY
Start: 2023-03-15 | End: 1900-01-01

## 2023-03-16 NOTE — PLAN
[FreeTextEntry1] : \par \par plan treat erosions with vaginal estrogen cream. Patient to follow up in 3-4 months for pessary reinsertion. \par all of her questions were answered she is agreeable with plan \par \par \par \par I Salud NEWTONC am scribing for the presence of Dr. Bland the following sections HISTORY OF PRESENT ILLNESS, PAST MEDICAL/FAMILY/SOCIAL HISTORY; REVIEW OF SYSTEMS; VITAL SIGNS; PHYSICAL EXAM; DISPOSITION. \par \par \par I personally performed the services described in the documentation, reviewed the documentation recorded by the scribe in my presence and it accurately and completely records my words and actions.\par

## 2023-03-16 NOTE — ASSESSMENT
[FreeTextEntry1] : \par \par On exam vaginal vault +erosions and irritation. patient complains of vaginal bleeding secondary to erosions. \par

## 2023-05-09 ENCOUNTER — APPOINTMENT (OUTPATIENT)
Dept: OBGYN | Facility: CLINIC | Age: 88
End: 2023-05-09
Payer: MEDICARE

## 2023-05-09 PROCEDURE — 99213 OFFICE O/P EST LOW 20 MIN: CPT

## 2023-05-09 NOTE — ASSESSMENT
[FreeTextEntry1] : Presents for pessary re-insertion. Mylex #3 \par \par Pessary was not reinserted in 03/2023 d/t erosions. which was treated with vaginal estrogen. \par pt states prolapse returned. vagina well healed at this time. pt to f/u in 3 months for cleaning and re-insertion. \par pt agreeable with plan.

## 2023-07-18 ENCOUNTER — OFFICE (OUTPATIENT)
Dept: URBAN - METROPOLITAN AREA CLINIC 102 | Facility: CLINIC | Age: 88
Setting detail: OPHTHALMOLOGY
End: 2023-07-18
Payer: MEDICARE

## 2023-07-18 DIAGNOSIS — Z96.1: ICD-10-CM

## 2023-07-18 DIAGNOSIS — H26.493: ICD-10-CM

## 2023-07-18 DIAGNOSIS — H40.013: ICD-10-CM

## 2023-07-18 DIAGNOSIS — H35.373: ICD-10-CM

## 2023-07-18 DIAGNOSIS — H16.223: ICD-10-CM

## 2023-07-18 PROCEDURE — 92134 CPTRZ OPH DX IMG PST SGM RTA: CPT | Performed by: OPHTHALMOLOGY

## 2023-07-18 PROCEDURE — 92014 COMPRE OPH EXAM EST PT 1/>: CPT | Performed by: OPHTHALMOLOGY

## 2023-07-18 ASSESSMENT — REFRACTION_MANIFEST
OS_SPHERE: +0.75
OS_VA1: 20/20-2
OD_SPHERE: +1.75
OS_CYLINDER: -1.50
OD_VA1: 20/20
OD_AXIS: 105
OD_ADD: +2.50
OD_ADD: +2.50
OS_ADD: +2.50
OD_SPHERE: +1.75
OS_ADD: +2.50
OS_CYLINDER: -1.50
OS_AXIS: 075
OS_AXIS: 075
OD_CYLINDER: -2.25
OS_VA1: 20/20-2
OS_SPHERE: +0.50
OD_CYLINDER: -2.25
OD_VA1: 20/20
OD_AXIS: 105

## 2023-07-18 ASSESSMENT — REFRACTION_CURRENTRX
OD_AXIS: 112
OD_ADD: +2.50
OD_OVR_VA: 20/
OS_SPHERE: +0.50
OD_ADD: +2.50
OS_OVR_VA: 20/
OS_SPHERE: -0.50
OD_AXIS: 105
OD_VPRISM_DIRECTION: BF
OD_CYLINDER: -2.25
OS_AXIS: 075
OS_AXIS: 080
OS_OVR_VA: 20/
OS_ADD: +2.50
OD_SPHERE: +1.75
OD_OVR_VA: 20/
OS_CYLINDER: -1.50
OS_VPRISM_DIRECTION: BF
OD_CYLINDER: -1.50
OD_SPHERE: +1.25
OS_ADD: +2.50
OS_CYLINDER: -1.00

## 2023-07-18 ASSESSMENT — REFRACTION_AUTOREFRACTION
OD_SPHERE: +1.75
OS_AXIS: 079
OS_CYLINDER: -2.00
OD_AXIS: 105
OS_SPHERE: +0.25
OD_CYLINDER: -2.50

## 2023-07-18 ASSESSMENT — CONFRONTATIONAL VISUAL FIELD TEST (CVF)
OD_FINDINGS: FULL
OS_FINDINGS: FULL

## 2023-07-18 ASSESSMENT — KERATOMETRY
OS_K1POWER_DIOPTERS: 42.50
OS_AXISANGLE_DEGREES: 162
OD_AXISANGLE_DEGREES: 015
OS_K2POWER_DIOPTERS: 44.25
OD_K2POWER_DIOPTERS: 44.50
OD_K1POWER_DIOPTERS: 42.25
METHOD_AUTO_MANUAL: AUTO

## 2023-07-18 ASSESSMENT — PACHYMETRY
OD_CT_UM: 548
OS_CT_CORRECTION: 1
OS_CT_UM: 538
OD_CT_CORRECTION: 0

## 2023-07-18 ASSESSMENT — AXIALLENGTH_DERIVED
OS_AL: 23.9349
OD_AL: 23.4439
OD_AL: 23.396
OS_AL: 23.7361
OS_AL: 23.6379
OD_AL: 23.396

## 2023-07-18 ASSESSMENT — SPHEQUIV_DERIVED
OS_SPHEQUIV: -0.25
OD_SPHEQUIV: 0.625
OD_SPHEQUIV: 0.625
OS_SPHEQUIV: -0.75
OD_SPHEQUIV: 0.5
OS_SPHEQUIV: 0

## 2023-07-18 ASSESSMENT — TONOMETRY
OS_IOP_MMHG: 18
OD_IOP_MMHG: 14
OS_IOP_MMHG: 15
OD_IOP_MMHG: 11

## 2023-07-18 ASSESSMENT — VISUAL ACUITY
OD_BCVA: 20/20-1
OS_BCVA: 20/20

## 2023-07-18 ASSESSMENT — DECREASING TEAR LAKE - SEVERITY SCORE
OS_DEC_TEARLAKE: T
OD_DEC_TEARLAKE: T

## 2023-08-09 ENCOUNTER — APPOINTMENT (OUTPATIENT)
Dept: OBGYN | Facility: CLINIC | Age: 88
End: 2023-08-09
Payer: MEDICARE

## 2023-08-09 PROCEDURE — 99213 OFFICE O/P EST LOW 20 MIN: CPT

## 2023-08-10 NOTE — PHYSICAL EXAM
[Chaperone Present] : A chaperone was present in the examining room during all aspects of the physical examination [Labia Majora] : normal [Labia Minora] : normal [Atrophy] : atrophy [Normal] : normal [Uterine Adnexae] : normal [FreeTextEntry1] : LAMAR GrantC [FreeTextEntry4] : erosion noted all throughout vagina

## 2023-08-10 NOTE — PLAN
[FreeTextEntry1] :  plan pessary removed due to erosions all throughout vagina and bleeding.  F/u 6 weeks for pessary insertion. Estrogen cream advised x2/ weekly. Rx renewed in office today.  I Lia VALADEZ am scribing for the presence of Dr. Bland the following sections HISTORY OF PRESENT ILLNESS, PAST MEDICAL/FAMILY/SOCIAL HISTORY; REVIEW OF SYSTEMS; VITAL SIGNS; PHYSICAL EXAM; DISPOSITION.    I personally performed the services described in the documentation, reviewed the documentation recorded by the scribe in my presence and it accurately and completely records my words and actions.

## 2023-08-31 NOTE — H&P CARDIOLOGY - NSWEIGHTCALCTOOLDRUG2_GEN_A_CORE
1. Have you been to the ER, urgent care clinic since your last visit? Hospitalized since your last visit? Yes, 7/7/18 [facility]     2. Have you seen or consulted any other health care providers outside of the [facility] since your last visit? Include any pap smears or colon screening.  No Detail Level: Zone Add High Risk Medication Management Associated Diagnosis?: No  used

## 2023-09-27 ENCOUNTER — RESULT CHARGE (OUTPATIENT)
Age: 88
End: 2023-09-27

## 2023-09-27 ENCOUNTER — APPOINTMENT (OUTPATIENT)
Dept: OBGYN | Facility: CLINIC | Age: 88
End: 2023-09-27
Payer: MEDICARE

## 2023-09-27 VITALS
DIASTOLIC BLOOD PRESSURE: 86 MMHG | HEIGHT: 61 IN | BODY MASS INDEX: 28.89 KG/M2 | SYSTOLIC BLOOD PRESSURE: 166 MMHG | HEART RATE: 62 BPM | WEIGHT: 153 LBS

## 2023-09-27 DIAGNOSIS — Z00.00 ENCOUNTER FOR GENERAL ADULT MEDICAL EXAMINATION W/OUT ABNORMAL FINDINGS: ICD-10-CM

## 2023-09-27 LAB
BILIRUB UR QL STRIP: NORMAL
CLARITY UR: CLEAR
COLLECTION METHOD: NORMAL
GLUCOSE UR-MCNC: NORMAL
HCG UR QL: 0 EU/DL
HEMOGLOBIN: 13
HGB UR QL STRIP.AUTO: NORMAL
KETONES UR-MCNC: NORMAL
LEUKOCYTE ESTERASE UR QL STRIP: NORMAL
NITRITE UR QL STRIP: NORMAL
PH UR STRIP: 5
PROT UR STRIP-MCNC: NORMAL
SP GR UR STRIP: 1

## 2023-09-27 PROCEDURE — 81003 URINALYSIS AUTO W/O SCOPE: CPT | Mod: QW

## 2023-09-27 PROCEDURE — G0101: CPT

## 2023-09-27 PROCEDURE — 85018 HEMOGLOBIN: CPT | Mod: QW

## 2023-09-27 RX ORDER — ESTRADIOL 0.1 MG/G
0.1 CREAM VAGINAL
Qty: 1 | Refills: 3 | Status: ACTIVE | COMMUNITY
Start: 2023-08-09 | End: 1900-01-01

## 2023-11-07 ENCOUNTER — APPOINTMENT (OUTPATIENT)
Dept: OBGYN | Facility: CLINIC | Age: 88
End: 2023-11-07
Payer: MEDICARE

## 2023-11-07 ENCOUNTER — RESULT CHARGE (OUTPATIENT)
Age: 88
End: 2023-11-07

## 2023-11-07 VITALS
HEART RATE: 61 BPM | WEIGHT: 151 LBS | DIASTOLIC BLOOD PRESSURE: 84 MMHG | SYSTOLIC BLOOD PRESSURE: 163 MMHG | BODY MASS INDEX: 28.51 KG/M2 | HEIGHT: 61 IN

## 2023-11-07 LAB — HEMOGLOBIN: 12.7

## 2023-11-07 PROCEDURE — 99212 OFFICE O/P EST SF 10 MIN: CPT

## 2024-02-07 ENCOUNTER — NON-APPOINTMENT (OUTPATIENT)
Age: 89
End: 2024-02-07

## 2024-02-23 NOTE — PATIENT PROFILE ADULT - DOES PATIENT HAVE ADVANCE DIRECTIVE
Please contact to schedule 3 month follow up. Last visit was 12/26/23 no piror labs needed. Can be video. -routing to    yes

## 2024-03-06 ENCOUNTER — APPOINTMENT (OUTPATIENT)
Dept: OBGYN | Facility: CLINIC | Age: 89
End: 2024-03-06
Payer: MEDICARE

## 2024-03-06 PROCEDURE — 99459 PELVIC EXAMINATION: CPT

## 2024-03-06 PROCEDURE — 99214 OFFICE O/P EST MOD 30 MIN: CPT

## 2024-03-06 RX ORDER — ESTRADIOL 0.1 MG/G
0.1 CREAM VAGINAL
Qty: 3 | Refills: 2 | Status: ACTIVE | COMMUNITY
Start: 2022-11-02 | End: 1900-01-01

## 2024-03-06 NOTE — PLAN
[FreeTextEntry1] : Restart estradiol vaginal cream Pessary not reinserted at this time Return in 6 weeks for evaluation Consider using smaller size pessary AQA Pt verbalized understanding  I [facility] am scribing for the presence of Dr. Bland the following sections HISTORY OF PRESENT ILLNESS, PAST MEDICAL/FAMILY/SOCIAL HISTORY; REVIEW OF SYSTEMS; VITAL SIGNS; PHYSICAL EXAM; DISPOSITION. I personally performed the services described in the documentation, reviewed the documentation recorded by the scribe in my presence and it accurately and completely records my words and actions.

## 2024-03-06 NOTE — HISTORY OF PRESENT ILLNESS
[FreeTextEntry1] : Patient is a 87 yo female here today for pessary removal and cleaning. c/o vaginal bleeding

## 2024-03-06 NOTE — PHYSICAL EXAM
[Vulvar Atrophy] : vulvar atrophy [Labia Majora] : normal [Labia Minora] : normal [Atrophy] : atrophy [Scant] : There was scant vaginal bleeding [Normal] : normal [FreeTextEntry1] : Yessica FNP-BC

## 2024-04-29 ENCOUNTER — APPOINTMENT (OUTPATIENT)
Dept: OBGYN | Facility: CLINIC | Age: 89
End: 2024-04-29

## 2024-06-18 ENCOUNTER — APPOINTMENT (OUTPATIENT)
Dept: OBGYN | Facility: CLINIC | Age: 89
End: 2024-06-18
Payer: MEDICARE

## 2024-06-18 PROCEDURE — 99212 OFFICE O/P EST SF 10 MIN: CPT

## 2024-06-20 NOTE — HISTORY OF PRESENT ILLNESS
[FreeTextEntry1] : 88 year old female presents for reinsertion of pessary. It was removed in 03/2024 s/s vaginal bleeding. she was initially using vaginal cream initially but has stopped. denies any vaginal bleeding since. she had a cardiac stent placed in 05/2024 at [facility].

## 2024-06-20 NOTE — PHYSICAL EXAM
[Chaperone Present] : A chaperone was present in the examining room during all aspects of the physical examination [07371] : A chaperone was present during the pelvic exam. [Labia Majora] : normal [Labia Minora] : normal [Normal] : normal [Uterine Adnexae] : normal [FreeTextEntry2] : LAMAR GrantC

## 2024-06-20 NOTE — PLAN
[FreeTextEntry1] : Pessary inserted today. she is to return in 3 months for removal and cleaning as well as her annual visit.  supportive care measures discussed. all questions answered, pt agreeable with plan.   I Lia VALADEZ am scribing for the presence of Dr. Bland the following sections HISTORY OF PRESENT ILLNESS, PAST MEDICAL/FAMILY/SOCIAL HISTORY; REVIEW OF SYSTEMS; VITAL SIGNS; PHYSICAL EXAM; DISPOSITION.    I personally performed the services described in the documentation, reviewed the documentation recorded by the scribe in my presence and it accurately and completely records my words and actions.

## 2024-09-11 ENCOUNTER — APPOINTMENT (OUTPATIENT)
Dept: OBGYN | Facility: CLINIC | Age: 89
End: 2024-09-11
Payer: MEDICARE

## 2024-09-11 ENCOUNTER — APPOINTMENT (OUTPATIENT)
Dept: OBGYN | Facility: CLINIC | Age: 89
End: 2024-09-11

## 2024-09-11 ENCOUNTER — NON-APPOINTMENT (OUTPATIENT)
Age: 89
End: 2024-09-11

## 2024-09-11 PROCEDURE — 99212 OFFICE O/P EST SF 10 MIN: CPT

## 2024-09-11 PROCEDURE — 99459 PELVIC EXAMINATION: CPT

## 2024-09-13 NOTE — PLAN
----- Message from Eloise Liao sent at 7/10/2023  8:30 AM CDT -----  Contact: jetty  Patient is calling to speak with the nurse regarding appointment. Reports needing her appointment reschedule for 07/14 with her other appointment if possible. Please give the patient a call back at [phone]   Thanks declan     
Rescheduled appt already did labs.7/10/23/sf   
[FreeTextEntry1] : Pessary removed, cleaned and reinserted today. she is to return in 3 months for removal and cleaning.  f/u for her annual visit- scheduled in october some erythema at the vaginal apex today but no erosions noted.  will consider leaving out pessary at her annual visit.  supportive care measures discussed. all questions answered, pt agreeable with plan.   I Lia VALADEZ am scribing for the presence of Dr. Bland the following sections HISTORY OF PRESENT ILLNESS, PAST MEDICAL/FAMILY/SOCIAL HISTORY; REVIEW OF SYSTEMS; VITAL SIGNS; PHYSICAL EXAM; DISPOSITION.    I personally performed the services described in the documentation, reviewed the documentation recorded by the scribe in my presence and it accurately and completely records my words and actions.
[FreeTextEntry1] : Pessary removed, cleaned and reinserted today. she is to return in 3 months for removal and cleaning.  f/u for her annual visit- scheduled in october some erythema at the vaginal apex today but no erosions noted.  will consider leaving out pessary at her annual visit.  supportive care measures discussed. all questions answered, pt agreeable with plan.   I Lia VALADEZ am scribing for the presence of Dr. Bland the following sections HISTORY OF PRESENT ILLNESS, PAST MEDICAL/FAMILY/SOCIAL HISTORY; REVIEW OF SYSTEMS; VITAL SIGNS; PHYSICAL EXAM; DISPOSITION.    I personally performed the services described in the documentation, reviewed the documentation recorded by the scribe in my presence and it accurately and completely records my words and actions.

## 2024-09-13 NOTE — HISTORY OF PRESENT ILLNESS
[FreeTextEntry1] : 89 year old female presents for reinsertion of pessary. It was removed in 03/2024 s/s vaginal bleeding. she was initially using vaginal cream initially but has stopped. denies any vaginal bleeding since. she had a cardiac stent placed in 05/2024 at [facility].

## 2024-09-13 NOTE — PHYSICAL EXAM
[Chaperone Present] : A chaperone was present in the examining room during all aspects of the physical examination [Labia Majora] : normal [Labia Minora] : normal [Normal] : normal [Uterine Adnexae] : normal [FreeTextEntry2] : LAMAR GrantC

## 2024-10-15 ENCOUNTER — APPOINTMENT (OUTPATIENT)
Dept: OBGYN | Facility: CLINIC | Age: 89
End: 2024-10-15
Payer: MEDICARE

## 2024-10-15 VITALS
DIASTOLIC BLOOD PRESSURE: 82 MMHG | BODY MASS INDEX: 27.02 KG/M2 | WEIGHT: 143 LBS | HEART RATE: 60 BPM | SYSTOLIC BLOOD PRESSURE: 163 MMHG

## 2024-10-15 DIAGNOSIS — R82.90 UNSPECIFIED ABNORMAL FINDINGS IN URINE: ICD-10-CM

## 2024-10-15 DIAGNOSIS — Z90.10 ACQUIRED ABSENCE OF UNSPECIFIED BREAST AND NIPPLE: ICD-10-CM

## 2024-10-15 DIAGNOSIS — Z12.4 ENCOUNTER FOR SCREENING FOR MALIGNANT NEOPLASM OF CERVIX: ICD-10-CM

## 2024-10-15 DIAGNOSIS — Z00.00 ENCOUNTER FOR GENERAL ADULT MEDICAL EXAMINATION W/OUT ABNORMAL FINDINGS: ICD-10-CM

## 2024-10-15 DIAGNOSIS — Z12.11 ENCOUNTER FOR SCREENING FOR MALIGNANT NEOPLASM OF COLON: ICD-10-CM

## 2024-10-15 DIAGNOSIS — Z85.3 PERSONAL HISTORY OF MALIGNANT NEOPLASM OF BREAST: ICD-10-CM

## 2024-10-15 DIAGNOSIS — Z13.820 ENCOUNTER FOR SCREENING FOR OSTEOPOROSIS: ICD-10-CM

## 2024-10-15 DIAGNOSIS — Z01.419 ENCOUNTER FOR GYNECOLOGICAL EXAMINATION (GENERAL) (ROUTINE) W/OUT ABNORMAL FINDINGS: ICD-10-CM

## 2024-10-15 LAB
BILIRUB UR QL STRIP: NEGATIVE
CLARITY UR: CLEAR
COLLECTION METHOD: NORMAL
DATE COLLECTED: NORMAL
GLUCOSE UR-MCNC: NEGATIVE
HCG UR QL: 0 EU/DL
HEMOCCULT SP1 STL QL: NEGATIVE
HEMOGLOBIN: 12.9
HGB UR QL STRIP.AUTO: NORMAL
KETONES UR-MCNC: NEGATIVE
LEUKOCYTE ESTERASE UR QL STRIP: NORMAL
NITRITE UR QL STRIP: NEGATIVE
PH UR STRIP: 5
PROT UR STRIP-MCNC: NEGATIVE
QUALITY CONTROL: YES
SP GR UR STRIP: 1

## 2024-10-15 PROCEDURE — G0101: CPT

## 2024-10-15 PROCEDURE — 85018 HEMOGLOBIN: CPT | Mod: QW

## 2024-10-15 PROCEDURE — 81003 URINALYSIS AUTO W/O SCOPE: CPT | Mod: QW

## 2024-10-15 PROCEDURE — 82270 OCCULT BLOOD FECES: CPT

## 2024-10-15 RX ORDER — ESTRADIOL 0.1 MG/G
0.1 CREAM VAGINAL
Qty: 1 | Refills: 3 | Status: ACTIVE | COMMUNITY
Start: 2024-10-15 | End: 1900-01-01

## 2024-10-31 ENCOUNTER — OFFICE (OUTPATIENT)
Dept: URBAN - METROPOLITAN AREA CLINIC 102 | Facility: CLINIC | Age: 89
Setting detail: OPHTHALMOLOGY
End: 2024-10-31
Payer: MEDICARE

## 2024-10-31 DIAGNOSIS — H40.013: ICD-10-CM

## 2024-10-31 DIAGNOSIS — Z96.1: ICD-10-CM

## 2024-10-31 DIAGNOSIS — H16.223: ICD-10-CM

## 2024-10-31 DIAGNOSIS — H35.373: ICD-10-CM

## 2024-10-31 DIAGNOSIS — H26.493: ICD-10-CM

## 2024-10-31 DIAGNOSIS — H52.4: ICD-10-CM

## 2024-10-31 PROCEDURE — 92250 FUNDUS PHOTOGRAPHY W/I&R: CPT | Performed by: OPHTHALMOLOGY

## 2024-10-31 PROCEDURE — 92015 DETERMINE REFRACTIVE STATE: CPT | Performed by: OPHTHALMOLOGY

## 2024-10-31 PROCEDURE — 92014 COMPRE OPH EXAM EST PT 1/>: CPT | Performed by: OPHTHALMOLOGY

## 2024-10-31 PROCEDURE — 92083 EXTENDED VISUAL FIELD XM: CPT | Performed by: OPHTHALMOLOGY

## 2024-10-31 ASSESSMENT — PACHYMETRY
OS_CT_UM: 538
OD_CT_CORRECTION: 0
OS_CT_CORRECTION: 1
OD_CT_UM: 548

## 2024-10-31 ASSESSMENT — REFRACTION_MANIFEST
OS_ADD: +2.75
OD_ADD: +2.50
OD_AXIS: 105
OS_AXIS: 075
OD_CYLINDER: -2.25
OD_CYLINDER: -2.75
OD_AXIS: 105
OS_AXIS: 075
OD_SPHERE: +2.00
OS_SPHERE: +0.50
OS_CYLINDER: -2.00
OS_VA1: 20/20-2
OD_VA1: 20/20
OS_ADD: +2.50
OS_CYLINDER: -1.50
OD_VA1: 20/20-
OS_VA1: 20/25-
OD_SPHERE: +1.75
OD_ADD: +2.75
OS_SPHERE: +0.75

## 2024-10-31 ASSESSMENT — TONOMETRY
OS_IOP_MMHG: 16
OD_IOP_MMHG: 11
OD_IOP_MMHG: 16
OS_IOP_MMHG: 12

## 2024-10-31 ASSESSMENT — REFRACTION_CURRENTRX
OD_AXIS: 105
OS_AXIS: 075
OD_ADD: +2.00
OS_CYLINDER: -1.75
OD_OVR_VA: 20/
OD_ADD: +2.50
OD_AXIS: 101
OS_OVR_VA: 20/
OD_CYLINDER: -2.25
OS_CYLINDER: -1.50
OD_CYLINDER: -3.00
OD_VPRISM_DIRECTION: PROGS
OD_OVR_VA: 20/
OS_SPHERE: +0.50
OD_SPHERE: +2.75
OS_OVR_VA: 20/
OS_VPRISM_DIRECTION: PROGS
OD_SPHERE: +1.75
OS_ADD: +2.50
OS_SPHERE: +0.75
OS_ADD: +2.25
OS_AXIS: 075

## 2024-10-31 ASSESSMENT — CONFRONTATIONAL VISUAL FIELD TEST (CVF)
OS_FINDINGS: FULL
OD_FINDINGS: FULL

## 2024-10-31 ASSESSMENT — VISUAL ACUITY
OD_BCVA: 20/40
OS_BCVA: 20/20-1

## 2024-11-25 ENCOUNTER — APPOINTMENT (OUTPATIENT)
Dept: OBGYN | Facility: CLINIC | Age: 89
End: 2024-11-25
Payer: MEDICARE

## 2024-11-25 DIAGNOSIS — N81.10 CYSTOCELE, UNSPECIFIED: ICD-10-CM

## 2024-11-25 DIAGNOSIS — Z46.89 ENCOUNTER FOR FITTING AND ADJUSTMENT OF OTHER SPECIFIED DEVICES: ICD-10-CM

## 2024-11-25 PROCEDURE — 99212 OFFICE O/P EST SF 10 MIN: CPT

## 2024-11-25 PROCEDURE — 99459 PELVIC EXAMINATION: CPT

## 2025-01-30 PROBLEM — H25.13 CATARACT ; BOTH EYES: Status: ACTIVE | Noted: 2025-01-30

## 2025-02-26 ENCOUNTER — APPOINTMENT (OUTPATIENT)
Dept: OBGYN | Facility: CLINIC | Age: 89
End: 2025-02-26
Payer: MEDICARE

## 2025-02-26 PROCEDURE — 99212 OFFICE O/P EST SF 10 MIN: CPT

## 2025-04-30 NOTE — PACU DISCHARGE NOTE - PAIN:
Per Dr. Diaz, informed pt of result note, as noted above. Pt verbalized understanding with no further questions or concerns at this time.     Controlled with current regime

## 2025-05-16 ENCOUNTER — OFFICE (OUTPATIENT)
Dept: URBAN - METROPOLITAN AREA CLINIC 102 | Facility: CLINIC | Age: OVER 89
Setting detail: OPHTHALMOLOGY
End: 2025-05-16
Payer: MEDICARE

## 2025-05-16 DIAGNOSIS — H16.223: ICD-10-CM

## 2025-05-16 DIAGNOSIS — Z96.1: ICD-10-CM

## 2025-05-16 DIAGNOSIS — H35.373: ICD-10-CM

## 2025-05-16 DIAGNOSIS — H26.493: ICD-10-CM

## 2025-05-16 DIAGNOSIS — H40.013: ICD-10-CM

## 2025-05-16 PROCEDURE — 92134 CPTRZ OPH DX IMG PST SGM RTA: CPT | Performed by: OPHTHALMOLOGY

## 2025-05-16 PROCEDURE — 92014 COMPRE OPH EXAM EST PT 1/>: CPT | Performed by: OPHTHALMOLOGY

## 2025-05-16 ASSESSMENT — REFRACTION_CURRENTRX
OD_ADD: +2.00
OS_OVR_VA: 20/
OD_CYLINDER: -3.00
OS_CYLINDER: -1.75
OD_OVR_VA: 20/
OS_AXIS: 075
OS_ADD: +2.25
OS_OVR_VA: 20/
OD_AXIS: 101
OS_AXIS: 075
OD_OVR_VA: 20/
OD_SPHERE: +2.75
OD_ADD: +2.50
OD_CYLINDER: -2.25
OD_SPHERE: +1.75
OS_ADD: +2.50
OD_VPRISM_DIRECTION: PROGS
OS_SPHERE: +0.75
OS_CYLINDER: -1.50
OD_AXIS: 105
OS_VPRISM_DIRECTION: PROGS
OS_SPHERE: +0.50

## 2025-05-16 ASSESSMENT — TONOMETRY
OS_IOP_MMHG: 21
OS_IOP_MMHG: 16
OD_IOP_MMHG: 17
OD_IOP_MMHG: 16

## 2025-05-16 ASSESSMENT — KERATOMETRY
OD_K1POWER_DIOPTERS: 41.75
OD_K2POWER_DIOPTERS: 44.50
OS_K1POWER_DIOPTERS: 42.75
METHOD_AUTO_MANUAL: AUTO
OS_K2POWER_DIOPTERS: 44.75
OS_AXISANGLE_DEGREES: 170
OD_AXISANGLE_DEGREES: 015

## 2025-05-16 ASSESSMENT — REFRACTION_AUTOREFRACTION
OD_CYLINDER: -3.00
OD_SPHERE: +2.00
OD_AXIS: 106
OS_SPHERE: +0.25
OS_CYLINDER: -2.25
OS_AXIS: 082

## 2025-05-16 ASSESSMENT — REFRACTION_MANIFEST
OD_VA1: 20/20
OS_VA1: 20/25-
OS_AXIS: 075
OD_CYLINDER: -2.75
OD_SPHERE: +2.00
OD_SPHERE: +1.75
OD_VA1: 20/20-
OS_VA1: 20/20-2
OD_ADD: +2.50
OD_ADD: +2.75
OS_CYLINDER: -1.50
OS_CYLINDER: -2.00
OS_SPHERE: +0.75
OD_CYLINDER: -2.25
OS_ADD: +2.50
OS_AXIS: 075
OS_ADD: +2.75
OD_AXIS: 105
OD_AXIS: 105
OS_SPHERE: +0.50

## 2025-05-16 ASSESSMENT — VISUAL ACUITY
OD_BCVA: 20/40+1
OS_BCVA: 20/25+2

## 2025-05-16 ASSESSMENT — CONFRONTATIONAL VISUAL FIELD TEST (CVF)
OD_FINDINGS: FULL
OS_FINDINGS: FULL

## 2025-05-16 ASSESSMENT — PACHYMETRY
OS_CT_UM: 538
OD_CT_UM: 548
OD_CT_CORRECTION: 0
OS_CT_CORRECTION: 1

## 2025-05-16 ASSESSMENT — DECREASING TEAR LAKE - SEVERITY SCORE
OD_DEC_TEARLAKE: T
OS_DEC_TEARLAKE: T

## 2025-05-28 ENCOUNTER — APPOINTMENT (OUTPATIENT)
Dept: OBGYN | Facility: CLINIC | Age: 89
End: 2025-05-28
Payer: MEDICARE

## 2025-05-28 DIAGNOSIS — N93.9 ABNORMAL UTERINE AND VAGINAL BLEEDING, UNSPECIFIED: ICD-10-CM

## 2025-05-28 DIAGNOSIS — Z46.89 ENCOUNTER FOR FITTING AND ADJUSTMENT OF OTHER SPECIFIED DEVICES: ICD-10-CM

## 2025-05-28 PROBLEM — H04.563 STENOSIS LACRIMAL PUNCTUM; BOTH EYES: Status: ACTIVE | Noted: 2025-05-28

## 2025-05-28 PROCEDURE — 99459 PELVIC EXAMINATION: CPT

## 2025-05-28 PROCEDURE — 99212 OFFICE O/P EST SF 10 MIN: CPT

## 2025-06-11 ENCOUNTER — NON-APPOINTMENT (OUTPATIENT)
Age: 89
End: 2025-06-11

## 2025-06-16 PROBLEM — Z86.59 HISTORY OF CLAUSTROPHOBIA: Status: ACTIVE | Noted: 2025-06-16

## 2025-06-16 RX ORDER — ALPRAZOLAM 0.25 MG/1
0.25 TABLET ORAL
Qty: 2 | Refills: 0 | Status: ACTIVE | COMMUNITY
Start: 2025-06-16 | End: 1900-01-01

## 2025-06-24 ENCOUNTER — APPOINTMENT (OUTPATIENT)
Dept: OBGYN | Facility: CLINIC | Age: 89
End: 2025-06-24

## 2025-08-02 ENCOUNTER — NON-APPOINTMENT (OUTPATIENT)
Age: 89
End: 2025-08-02

## 2025-08-04 ENCOUNTER — APPOINTMENT (OUTPATIENT)
Dept: OBGYN | Facility: CLINIC | Age: 89
End: 2025-08-04
Payer: MEDICARE

## 2025-08-04 DIAGNOSIS — N93.9 ABNORMAL UTERINE AND VAGINAL BLEEDING, UNSPECIFIED: ICD-10-CM

## 2025-08-04 PROCEDURE — 99212 OFFICE O/P EST SF 10 MIN: CPT

## 2025-08-06 PROBLEM — H04.563 STENOSIS LACRIMAL PUNCTUM; BOTH EYES: Status: ACTIVE | Noted: 2025-08-06
